# Patient Record
Sex: FEMALE | Race: BLACK OR AFRICAN AMERICAN | NOT HISPANIC OR LATINO | Employment: FULL TIME | URBAN - METROPOLITAN AREA
[De-identification: names, ages, dates, MRNs, and addresses within clinical notes are randomized per-mention and may not be internally consistent; named-entity substitution may affect disease eponyms.]

---

## 2018-05-23 PROBLEM — N87.1 CIN II (CERVICAL INTRAEPITHELIAL NEOPLASIA II): Status: ACTIVE | Noted: 2018-05-23

## 2019-01-05 ENCOUNTER — HOSPITAL ENCOUNTER (EMERGENCY)
Facility: HOSPITAL | Age: 34
Discharge: HOME | End: 2019-01-05
Attending: EMERGENCY MEDICINE

## 2019-01-05 VITALS
RESPIRATION RATE: 16 BRPM | HEART RATE: 67 BPM | WEIGHT: 156 LBS | TEMPERATURE: 98 F | SYSTOLIC BLOOD PRESSURE: 110 MMHG | OXYGEN SATURATION: 97 % | BODY MASS INDEX: 26.63 KG/M2 | HEIGHT: 64 IN | DIASTOLIC BLOOD PRESSURE: 72 MMHG

## 2019-01-05 DIAGNOSIS — R21 RASH: Primary | ICD-10-CM

## 2019-01-05 LAB
B-HCG UR QL: NEGATIVE
CTP QC/QA: YES

## 2019-01-05 PROCEDURE — 81025 URINE PREGNANCY TEST: CPT | Performed by: PHYSICIAN ASSISTANT

## 2019-01-05 PROCEDURE — 99284 EMERGENCY DEPT VISIT MOD MDM: CPT | Mod: 25

## 2019-01-05 PROCEDURE — 63600175 PHARM REV CODE 636 W HCPCS: Performed by: PHYSICIAN ASSISTANT

## 2019-01-05 PROCEDURE — 96372 THER/PROPH/DIAG INJ SC/IM: CPT

## 2019-01-05 RX ORDER — DEXAMETHASONE SODIUM PHOSPHATE 4 MG/ML
12 INJECTION, SOLUTION INTRA-ARTICULAR; INTRALESIONAL; INTRAMUSCULAR; INTRAVENOUS; SOFT TISSUE
Status: COMPLETED | OUTPATIENT
Start: 2019-01-05 | End: 2019-01-05

## 2019-01-05 RX ORDER — TRIAMCINOLONE ACETONIDE 1 MG/G
CREAM TOPICAL 2 TIMES DAILY
Qty: 15 G | Refills: 1 | Status: SHIPPED | OUTPATIENT
Start: 2019-01-05

## 2019-01-05 RX ORDER — CLOTRIMAZOLE 1 %
CREAM (GRAM) TOPICAL
Qty: 15 G | Refills: 1 | Status: SHIPPED | OUTPATIENT
Start: 2019-01-05

## 2019-01-05 RX ADMIN — DEXAMETHASONE SODIUM PHOSPHATE 12 MG: 4 INJECTION, SOLUTION INTRA-ARTICULAR; INTRALESIONAL; INTRAMUSCULAR; INTRAVENOUS; SOFT TISSUE at 03:01

## 2019-01-05 NOTE — ED TRIAGE NOTES
Patient came to the ED because she states that she had an allergic reactions. Patient didn't have nothing to eat. She was at work when it happened.

## 2019-01-05 NOTE — DISCHARGE INSTRUCTIONS
Continue with Benadryl as needed for itch.  Mix Kenalog and Lotrimin together, apply twice daily.  Follow up with Dermatology for re-evaluation.  Return to this ED if you begin with fever, if rash spreads or worsens, if any other problems occur.

## 2019-01-05 NOTE — ED PROVIDER NOTES
Encounter Date: 1/5/2019    SCRIBE #1 NOTE: I, Carolina Bergman, am scribing for, and in the presence of,  Elliott Vaughn PA-C. I have scribed the following portions of the note - Other sections scribed: ROS and HPI.       History     Chief Complaint   Patient presents with    Allergic Reaction     pt states she developed a rash on her legs a few days ago, used otc cortisone cream and taken oral benadryl but rash continue to itch and had now spresd to abd / chest     CC: Allergic Reaction    HPI: This 33 y.o. female presents to the ED complaining of an acute onset of rash to her inner thighs for last 3-4 days. Rash has now progressed to her groin and underneath her breasts. She reports associated itch and burning. Denies any known allergies. She uses dove soap for sensitive skin. Denies any new soap or detergent use. No relief after taking oral benadryl and using OTC hydrocortisone cream. Denies fever, nausea, pain or any other related sx.      The history is provided by the patient. No  was used.     Review of patient's allergies indicates:   Allergen Reactions    Screven Rash     History reviewed. No pertinent past medical history.  Past Surgical History:   Procedure Laterality Date    FRACTURE SURGERY      KNEE RECONSTRUCTION, MEDIAL PATELLAR FEMORAL LIGAMENT      TONSILLECTOMY       Family History   Problem Relation Age of Onset    Breast cancer Paternal Aunt     Colon cancer Neg Hx     Ovarian cancer Neg Hx      Social History     Tobacco Use    Smoking status: Never Smoker    Smokeless tobacco: Never Used   Substance Use Topics    Alcohol use: No    Drug use: No     Review of Systems   Constitutional: Negative for chills and fever.   HENT: Negative for sore throat.    Eyes: Negative.    Respiratory: Negative for cough and shortness of breath.    Cardiovascular: Negative for chest pain.   Gastrointestinal: Negative for nausea and vomiting.   Endocrine: Negative.    Genitourinary:  Negative for dysuria.   Musculoskeletal: Negative for back pain, myalgias, neck pain and neck stiffness.   Skin: Positive for rash.        (+) rash to inner thighs L>R, groin, underneath breast, (+) itch and burning   Neurological: Negative for weakness and headaches.   Hematological: Does not bruise/bleed easily.   Psychiatric/Behavioral: Negative.    All other systems reviewed and are negative.      Physical Exam     Initial Vitals [01/05/19 0218]   BP Pulse Resp Temp SpO2   (!) 142/65 80 18 98.5 °F (36.9 °C) 100 %      MAP       --         Physical Exam    Nursing note and vitals reviewed.  Constitutional: She appears well-developed and well-nourished. She is not diaphoretic. No distress.   Overall well-appearing, nontoxic. Resting comfortably on exam table.    HENT:   Head: Normocephalic and atraumatic.   Eyes: Conjunctivae and EOM are normal. Pupils are equal, round, and reactive to light.   Neck: Normal range of motion. Neck supple. No tracheal deviation present.   Cardiovascular: Intact distal pulses.   Pulmonary/Chest: Breath sounds normal. No stridor. No respiratory distress. She has no wheezes.   Abdominal: Soft. Bowel sounds are normal. She exhibits no distension. There is no tenderness.   Musculoskeletal: Normal range of motion. She exhibits no tenderness.   Lymphadenopathy:     She has no cervical adenopathy.   Neurological: She is alert and oriented to person, place, and time.   Skin: Skin is warm and dry. Capillary refill takes less than 2 seconds. Rash noted.   Scattered, crusted, macular rash to L inner thigh. Rash with faint erythema. No palpable fluctuance or induration. No ttp. No open wound. No vesicular lesions. Similar rash just below L breast, L abdomen; much more scant.   Psychiatric: She has a normal mood and affect. Her behavior is normal. Judgment and thought content normal.         ED Course   Procedures  Labs Reviewed   POCT URINE PREGNANCY          Imaging Results    None           Medical Decision Making:   Differential Diagnosis:   Dermatitis, local allergic reaction, cellulitis, folliculitis, eczema, scabies  ED Management:  34yo F with pruritic rash x 3-4 days. No new exposure. No fever. No tenderness. Rash without vesicular lesions or warmth. Macular, crusted, without fluctuance. I do not think this is a cellulitis. It has the appearance of a heat rash, given inner thigh, under breast. Her vitals are reassuring. She is well-appearing and nontoxic. She is using topical steroid with mild relief, however pruritis persists. Will start on kenalog, clotrimazole mixture; IM decadron; continue benadryl prn; have her f/u with derm. No tongue or oral swelling. No stridor. No cough. No wheeze. No hx anaphylaxis. Doubt emergent process. Return precautions given.             Scribe Attestation:   Scribe #1: I performed the above scribed service and the documentation accurately describes the services I performed. I attest to the accuracy of the note.    Attending Attestation:           Physician Attestation for Scribe:  Physician Attestation Statement for Scribe #1: I, Elliott Vaughn PA-C, reviewed documentation, as scribed by Carolina Bergman in my presence, and it is both accurate and complete.                    Clinical Impression:   The encounter diagnosis was Rash.      Disposition:   Disposition: Discharged  Condition: Stable                        Elliott Vaughn PA-C  01/05/19 1436